# Patient Record
Sex: FEMALE | Race: WHITE | NOT HISPANIC OR LATINO
[De-identification: names, ages, dates, MRNs, and addresses within clinical notes are randomized per-mention and may not be internally consistent; named-entity substitution may affect disease eponyms.]

---

## 2023-02-03 PROBLEM — Z00.00 ENCOUNTER FOR PREVENTIVE HEALTH EXAMINATION: Status: ACTIVE | Noted: 2023-02-03

## 2023-02-06 ENCOUNTER — APPOINTMENT (OUTPATIENT)
Dept: OTOLARYNGOLOGY | Facility: CLINIC | Age: 88
End: 2023-02-06
Payer: MEDICARE

## 2023-02-06 ENCOUNTER — NON-APPOINTMENT (OUTPATIENT)
Age: 88
End: 2023-02-06

## 2023-02-06 VITALS
SYSTOLIC BLOOD PRESSURE: 120 MMHG | DIASTOLIC BLOOD PRESSURE: 67 MMHG | WEIGHT: 138 LBS | HEIGHT: 63 IN | HEART RATE: 84 BPM | BODY MASS INDEX: 24.45 KG/M2

## 2023-02-06 DIAGNOSIS — Z86.39 PERSONAL HISTORY OF OTHER ENDOCRINE, NUTRITIONAL AND METABOLIC DISEASE: ICD-10-CM

## 2023-02-06 DIAGNOSIS — Z83.3 FAMILY HISTORY OF DIABETES MELLITUS: ICD-10-CM

## 2023-02-06 DIAGNOSIS — Z86.79 PERSONAL HISTORY OF OTHER DISEASES OF THE CIRCULATORY SYSTEM: ICD-10-CM

## 2023-02-06 DIAGNOSIS — Z82.49 FAMILY HISTORY OF ISCHEMIC HEART DISEASE AND OTHER DISEASES OF THE CIRCULATORY SYSTEM: ICD-10-CM

## 2023-02-06 DIAGNOSIS — Z87.891 PERSONAL HISTORY OF NICOTINE DEPENDENCE: ICD-10-CM

## 2023-02-06 PROCEDURE — 99203 OFFICE O/P NEW LOW 30 MIN: CPT

## 2023-02-06 PROCEDURE — 92567 TYMPANOMETRY: CPT

## 2023-02-06 PROCEDURE — 92557 COMPREHENSIVE HEARING TEST: CPT

## 2023-02-06 RX ORDER — DIFLUPREDNATE 0.5 MG/ML
0.05 EMULSION OPHTHALMIC
Qty: 5 | Refills: 0 | Status: ACTIVE | COMMUNITY
Start: 2023-01-19

## 2023-02-06 RX ORDER — METOPROLOL TARTRATE 25 MG/1
25 TABLET, FILM COATED ORAL
Qty: 270 | Refills: 0 | Status: ACTIVE | COMMUNITY
Start: 2023-01-10

## 2023-02-06 RX ORDER — LOSARTAN POTASSIUM 25 MG/1
25 TABLET, FILM COATED ORAL
Qty: 90 | Refills: 0 | Status: ACTIVE | COMMUNITY
Start: 2023-01-10

## 2023-02-06 RX ORDER — APIXABAN 5 MG/1
5 TABLET, FILM COATED ORAL
Qty: 180 | Refills: 0 | Status: ACTIVE | COMMUNITY
Start: 2022-08-31

## 2023-02-06 RX ORDER — LEVOTHYROXINE SODIUM 0.07 MG/1
75 TABLET ORAL
Qty: 90 | Refills: 0 | Status: ACTIVE | COMMUNITY
Start: 2023-01-10

## 2023-02-06 RX ORDER — FLUOCINOLONE ACETONIDE 0.11 MG/ML
0.01 OIL TOPICAL
Qty: 118 | Refills: 0 | Status: ACTIVE | COMMUNITY
Start: 2022-08-25

## 2023-02-06 RX ORDER — ESCITALOPRAM OXALATE 10 MG/1
10 TABLET ORAL
Qty: 90 | Refills: 0 | Status: DISCONTINUED | COMMUNITY
Start: 2023-01-10

## 2023-02-06 RX ORDER — ROSUVASTATIN CALCIUM 20 MG/1
20 TABLET, FILM COATED ORAL
Qty: 90 | Refills: 0 | Status: ACTIVE | COMMUNITY
Start: 2023-01-10

## 2023-02-06 NOTE — PHYSICAL EXAM
[Hearing Posada Test (Tuning Fork On Forehead)] : no lateralization of tone [] : septum deviated to the left [Midline] : trachea located in midline position [Normal] : orientation to person, place, and time: normal [de-identified] : enlarged submandibular region, right lymph node [FreeTextEntry8] : cerumen removed via curettage [FreeTextEntry9] : cerumen removed via curettage [de-identified] : class 1 [FreeTextEntry2] : sinuses nontender to percussion.

## 2023-02-06 NOTE — HISTORY OF PRESENT ILLNESS
[de-identified] : Patient presents to office with hearing loss and loud sound in both her ears that comes and goes particularly in a room with no background noise. Patient states she always ask individuals to repeat themselves. She states she has a hard time recognizing words. Pt notes she has noticed the decrease in hearing for the past couple years. Pt notes she had hearing aids for 5 years which she purchased online, but she hasn't been using them. Pt notes she fell on her left side last year.

## 2023-02-06 NOTE — ASSESSMENT
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 asymmetric SRS noted\par right: 92% discrim at 75db\par left: 76% discrim 70db\par \par Plan:\par Cerumen removed b/l. Audio - results interpreted by Dr. Sol and reviewed with the patient.  US neck ordered. CT head ordered.

## 2023-02-06 NOTE — DATA REVIEWED
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 asymmetric SRS noted\par REC: ENT f/u, re-eval per MD, consider new HAs, further testing per MD\par

## 2023-02-06 NOTE — ADDENDUM
[FreeTextEntry1] : Documented by Chantelle Hines acting as scribe for Dr. Sol on 02/06/2023.\par \par All Medical record entries made by the scribe were at my, Dr. Sol,direction and personally dictated by me on 02/06/2023. I have reviewed the chart and agree that the record accurately reflects my personal performance of the history, physical exam, assessment and plan. I have also personally directed, reviewed, and agreed with the discharge instructions.

## 2023-03-13 ENCOUNTER — APPOINTMENT (OUTPATIENT)
Dept: OTOLARYNGOLOGY | Facility: CLINIC | Age: 88
End: 2023-03-13
Payer: MEDICARE

## 2023-03-13 VITALS
SYSTOLIC BLOOD PRESSURE: 139 MMHG | WEIGHT: 140 LBS | HEIGHT: 63 IN | BODY MASS INDEX: 24.8 KG/M2 | DIASTOLIC BLOOD PRESSURE: 73 MMHG | HEART RATE: 69 BPM

## 2023-03-13 DIAGNOSIS — R22.0 LOCALIZED SWELLING, MASS AND LUMP, HEAD: ICD-10-CM

## 2023-03-13 DIAGNOSIS — H93.13 TINNITUS, BILATERAL: ICD-10-CM

## 2023-03-13 DIAGNOSIS — R22.1 LOCALIZED SWELLING, MASS AND LUMP, HEAD: ICD-10-CM

## 2023-03-13 PROCEDURE — 99214 OFFICE O/P EST MOD 30 MIN: CPT

## 2023-03-13 NOTE — HISTORY OF PRESENT ILLNESS
[de-identified] : Pt presents with h/o asymmetric hearing loss, b/l tinnitus, enlarged submandibular region, right lymph node presents today to discuss CT head and IACs and US neck results. Denies hearing herself blink. Pt notes she gets PND in the morning. Pt notes she tried Flonase, but it didn't help.

## 2023-03-13 NOTE — ASSESSMENT
[FreeTextEntry1] : Reviewed and reconciled medications, allergies, PMHx, PSHx, SocHx, FMHx.\par \par Pt presents with h/o asymmetric hearing loss, b/l tinnitus, enlarged submandibular region, right lymph node presents today to discuss CT head and IACs and US neck results. 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 Discussed previous audio. Consider new hearing aids. More the 50% of time was spent counseling the patient. Add citrus or sour to water. Atrovent - 1 or 2 sprays bilaterally up to QID, spray laterally. FU 1 year.

## 2023-03-13 NOTE — ADDENDUM
[FreeTextEntry1] : Documented by Chantelle Hines acting as scribe for Dr. Sol on 03/13/2023.\par \par All Medical record entries made by the scribe were at my, Dr. Sol,direction and personally dictated by me on 03/13/2023. I have reviewed the chart and agree that the record accurately reflects my personal performance of the history, physical exam, assessment and plan. I have also personally directed, reviewed, and agreed with the discharge instructions.

## 2023-03-16 ENCOUNTER — EMERGENCY (EMERGENCY)
Facility: HOSPITAL | Age: 88
LOS: 1 days | Discharge: HOME | End: 2023-03-16
Attending: EMERGENCY MEDICINE | Admitting: EMERGENCY MEDICINE
Payer: MEDICARE

## 2023-03-16 VITALS
WEIGHT: 138.01 LBS | DIASTOLIC BLOOD PRESSURE: 66 MMHG | OXYGEN SATURATION: 97 % | TEMPERATURE: 98 F | HEIGHT: 64 IN | HEART RATE: 67 BPM | SYSTOLIC BLOOD PRESSURE: 123 MMHG | RESPIRATION RATE: 18 BRPM

## 2023-03-16 VITALS
RESPIRATION RATE: 18 BRPM | SYSTOLIC BLOOD PRESSURE: 185 MMHG | HEART RATE: 71 BPM | DIASTOLIC BLOOD PRESSURE: 84 MMHG | TEMPERATURE: 97 F | OXYGEN SATURATION: 95 %

## 2023-03-16 PROCEDURE — 73060 X-RAY EXAM OF HUMERUS: CPT

## 2023-03-16 PROCEDURE — 73030 X-RAY EXAM OF SHOULDER: CPT

## 2023-03-16 PROCEDURE — 71045 X-RAY EXAM CHEST 1 VIEW: CPT | Mod: 26

## 2023-03-16 PROCEDURE — 99284 EMERGENCY DEPT VISIT MOD MDM: CPT | Mod: 25

## 2023-03-16 PROCEDURE — 73020 X-RAY EXAM OF SHOULDER: CPT

## 2023-03-16 PROCEDURE — 73030 X-RAY EXAM OF SHOULDER: CPT | Mod: 26,LT

## 2023-03-16 PROCEDURE — 71045 X-RAY EXAM CHEST 1 VIEW: CPT

## 2023-03-16 PROCEDURE — 99285 EMERGENCY DEPT VISIT HI MDM: CPT

## 2023-03-16 PROCEDURE — 73060 X-RAY EXAM OF HUMERUS: CPT | Mod: 26,LT

## 2023-03-16 RX ORDER — ACETAMINOPHEN 500 MG
650 TABLET ORAL ONCE
Refills: 0 | Status: COMPLETED | OUTPATIENT
Start: 2023-03-16 | End: 2023-03-16

## 2023-03-16 RX ADMIN — Medication 650 MILLIGRAM(S): at 12:19

## 2023-03-16 NOTE — ED PROVIDER NOTE - CARE PROVIDER_API CALL
Neri Eldridge  ORTHOPAEDIC SURGERY  [address] [place] [address]  Phone: [phone]  Fax: [phone]  Follow Up Time: 4-6 Days

## 2023-03-16 NOTE — CONSULT NOTE ADULT - SUBJECTIVE AND OBJECTIVE BOX
89y Female presents with L Shoulder pain s/p MF this morning. RHD. Community ambulator w/o assistance at baseline. Pt is visiting from [place]. Pt states she was walking in her house, tripped and fell onto L shoulder. No HS/LOC. Pt noticed immediate pain and inability to range shoulder s/p MF. No hx of previous injury to shoulder. No other injuries at this time. Denies Numbness/tingling, weakness, CP, SOB, fever, chills or any other complaints.     PAST MEDICAL & SURGICAL HISTORY:    Home Medications:    Allergies    No Known Allergies    Intolerances    Vital Signs Last 24 Hrs  T(C): 36.5 (16 Mar 2023 10:29), Max: 36.5 (16 Mar 2023 10:29)  T(F): 97.7 (16 Mar 2023 10:29), Max: 97.7 (16 Mar 2023 10:29)  HR: 67 (16 Mar 2023 10:29) (67 - 67)  BP: 123/66 (16 Mar 2023 10:29) (123/66 - 123/66)  BP(mean): --  RR: 18 (16 Mar 2023 10:29) (18 - 18)  SpO2: 97% (16 Mar 2023 10:29) (97% - 97%)    Parameters below as of 16 Mar 2023 10:29  Patient On (Oxygen Delivery Method): room air    PHYSICAL EXAM  General: NAD, Awake and Alert  LUE:   Skin intact, mild swelling noted at left shoulder   ROM of shoulder limited secondary to pain  TTP at shoulder diffusely  No TTP elsewhere along extremity  + AIN/PIN/U/M/Musc/Ax  SILT C5-T1  2+ Radial  Compartments soft and compressible     SECONDARY EXAM: Benign, Skin intact, SILT throughout, motor grossly intact throughout, no other orthopedic injuries at this time, compartments soft and compressible    SPINE: Skin intact, no bony tenderness or step-offs appreciated throughout cervical/thoracic/lumbar/sacral spine    RUE: Skin intact, no erythema, ecchymosis, edema, gross deformity, NTTP over the bony prominences of the shoulder/elbow/wrist/hand, painless passive/active ROM of the shoulder/elbow/wrist/hand, C5-T1 SILT, motor grossly intact throughout axillary/musculocutaneous/radial/median/ulnar nerves, + radial pulse    BLLE: Skin intact, no erythema, ecchymosis, edema, gross deformity, NTTP over the bony prominences of the hip/knee/ankle/foot, painless passive/active ROM of the hip/knee/ankle/foot, L2-S1 SILT, motor grossly intact throughout Hip Flexors/Quadriceps/Hamstrings/TA/EHL/FHL/GSC, + DP/PT pulses, no pain with log roll, no pain on axial loading, compartments soft and compressible, calf nontender       IMAGING:  XR : L Proximal humerus fracture, no dislocation    Assessment/Plan:  89y Female with L Proximal humerus fracture s/p MF.     - Plan for non-operative management at this time. NWB LUE in sling.   -Pain control as needed  -DVT ppx per primary  -Ortho stable for dc  - No acute orthopedic surgical intervention at this time.   - Patient would like to follow up w/ orthopedic surgeon in [place] after she returns in 1 week. Dr. Eldridge aware. If any issues prior to returning to [place], pt may follow up w/ Dr. Eldridge in office.   -Discussed plan w/ Dr. Eldridge who agrees.

## 2023-03-16 NOTE — ED PROVIDER NOTE - PATIENT PORTAL LINK FT
You can access the FollowMyHealth Patient Portal offered by [facility] by registering at the following website: [email or web address]. By joining [facility]’s FollowMyHealth portal, you will also be able to view your health information using other applications (apps) compatible with our system.

## 2023-03-16 NOTE — ED ADULT NURSE NOTE - NSIMPLEMENTINTERV_GEN_ALL_ED
Implemented All Fall Risk Interventions:  [place] to call system. Call bell, personal items and telephone within reach. Instruct patient to call for assistance. Room bathroom lighting operational. Non-slip footwear when patient is off stretcher. Physically safe environment: no spills, clutter or unnecessary equipment. Stretcher in lowest position, wheels locked, appropriate side rails in place. Provide visual cue, wrist band, yellow gown, etc. Monitor gait and stability. Monitor for mental status changes and reorient to person, place, and time. Review medications for side effects contributing to fall risk. Reinforce activity limits and safety measures with patient and family.

## 2023-03-16 NOTE — ED PROVIDER NOTE - CLINICAL SUMMARY MEDICAL DECISION MAKING FREE TEXT BOX
89yo F with DM2, HTN, afib on eliquis, c/o left shoulder pain s/p trip and fall. sling, xrays, tylenol, shoulder dislocation, humerus fracture.

## 2023-03-16 NOTE — ED ADULT TRIAGE NOTE - CHIEF COMPLAINT QUOTE
Pt arrived to triage c/o shoulder pain s/p mechanical trip and fall 30 minutes ago.   Pt denies head injury.  L hand warm with +radial pulses and good  strength.  Pt unable to lift L arm, L elbow mobile with assistance.   Pt denies additional injury with fall, denies pelvic/abd/neck pain.  Pt takes eliquis.  BN

## 2023-03-16 NOTE — ED PROVIDER NOTE - OBJECTIVE STATEMENT
89yo F with DM2, HTN, afib on eliquis, c/o left shoulder pain s/p trip and fall x 1 hour ago, pt st turned and lost footing and fell onto left shoulder. pt denies dizziness, lightheadedness, head trauma, headache, neck pain, LOC, numbness, weakness, back pain, chest pain, sob, palpitations, abd pain.    right hand dominant  pcp= dr gonzalez in [place]  covid vaccine x 3  pt offered painmedication but st will only take tylenol

## 2023-03-16 NOTE — ED ADULT NURSE NOTE - OBJECTIVE STATEMENT
patient comes in after a fall at home. patient is A&Ox4. no LOC. patient did not hit head. patient turned around and lost her balance. patient fell on L shoulder. patient has pain to L shoulder. sling in place from Triage.

## 2023-03-16 NOTE — ED PROVIDER NOTE - PHYSICAL EXAMINATION
Gen: Awake, Alert, WD, WN, NAD  Head:  NC/AT  Eyes:  PERRL, EOMI, Conjunctiva pink, lids normal, no scleral icterus  ENT: OP clear, no exudates, no erythema, uvula midline, moist mucus membranes  Neck: supple, nontender, trachea midline  Cardiac/CV:  S1 S2, RRR, no M/G/R  Chest: nontender, no crepitus, no clavicle tenderness  Respiratory/Pulm:  CTAB, good air movement, normal resp effort, no wheezes/stridor/retractions/rales/rhonchi  Gastrointestinal/Abdomen:  Soft, nontender, nondistended, +BS, no rebound/guarding  Pelvis: stable, nontender, Hips: FROM, nontender  Back:  no CVAT, no MLT  Ext:  warm, well perfused, no cyanosis, no erythema, no edema, 2+ radial pulses, +swelling and ttp left GH joint area with dec rom sec to pain, sling applied  Skin: intact, no rash, no vesicles, no petechiae, no ecchymosis  Neuro:  AAOx3, sensation intact, motor 5/5 x 4 extremities, normal gait, speech clear

## 2023-03-16 NOTE — ED PROVIDER NOTE - NSFOLLOWUPINSTRUCTIONS_ED_ALL_ED_FT
[employer]® CareNotes®     :  [facility]  	      PROXIMAL HUMERUS FRACTURE - AfterCare(R) Instructions(ER/ED)     Proximal Humerus Fracture    WHAT YOU NEED TO KNOW:    A proximal humerus fracture is a crack or break in the top of your upper arm bone. The proximal humerus is one of the bones in your shoulder joint.  Shoulder Anatomy     DISCHARGE INSTRUCTIONS:    Return to the emergency department if:   •Your pain does not get better or gets worse, even after you rest and take medicine.    •Your arm, hand, or fingers feel numb.    •The skin over your fracture is swollen, cold, or pale.    •You cannot move your arm, hand, or fingers.     Call your doctor or orthopedist if:   •You have a fever.    •Your sling gets wet, damaged, or falls off.    •You have questions or concerns about your condition or care.    Medicines: You may need any of the following:   •Prescription pain medicine may be given. Ask your healthcare provider how to take this medicine safely. Some prescription pain medicines contain acetaminophen. Do not take other medicines that contain acetaminophen without talking to your healthcare provider. Too much acetaminophen may cause liver damage. Prescription pain medicine may cause constipation. Ask your healthcare provider how to prevent or treat constipation.       •NSAIDs, such as ibuprofen, help decrease swelling, pain, and fever. This medicine is available with or without a doctor's order. NSAIDs can cause stomach bleeding or kidney problems in certain people. If you take blood thinner medicine, always ask your healthcare provider if NSAIDs are safe for you. Always read the medicine label and follow directions.      •Acetaminophen decreases pain and fever. It is available without a doctor's order. Ask how much to take and how often to take it. Follow directions. Read the labels of all other medicines you are using to see if they also contain acetaminophen, or ask your doctor or pharmacist. Acetaminophen can cause liver damage if not taken correctly.      •Take your medicine as directed. Contact your healthcare provider if you think your medicine is not helping or if you have side effects. Tell your provider if you are allergic to any medicine. Keep a list of the medicines, vitamins, and herbs you take. Include the amounts, and when and why you take them. Bring the list or the pill bottles to follow-up visits. Carry your medicine list with you in case of an emergency.      A sling may be needed to hold your broken bones in place. It will decrease your arm movement and allow the bones to heal.  Shoulder Sling     Manage your symptoms:   •Rest your arm as much as possible. Ask your healthcare provider when you can move your arm. Also ask when you can return to sports or vigorous exercises.    •Apply ice on your arm for 15 to 20 minutes every hour or as directed. Use an ice pack, or put crushed ice in a plastic bag. Cover it with a towel before you put it on your arm. Ice helps prevent tissue damage and decreases swelling and pain.    •Go to physical therapy as directed. A physical therapist teaches you exercises to help improve movement and strength, and to decrease pain.    Follow up with your doctor or orthopedist as directed: Write down your questions so you remember to ask them during your visits.     © Copyright [employer] 2023       Keep sling on.  Cold packs 10 minutes on/10 minutes off  Sleep in reclined position.  Follow up with orthopedist this week. Dr. Venegas  Tylenol 1-2 tablets every 6 hours as needed.  Please return to the Emergency Department immediately for any problems or concerns. [employer]® CareNotes®     :  [facility]  	      PROXIMAL HUMERUS FRACTURE - AfterCare(R) Instructions(ER/ED)     Proximal Humerus Fracture    WHAT YOU NEED TO KNOW:    A proximal humerus fracture is a crack or break in the top of your upper arm bone. The proximal humerus is one of the bones in your shoulder joint.  Shoulder Anatomy     DISCHARGE INSTRUCTIONS:    Return to the emergency department if:   •Your pain does not get better or gets worse, even after you rest and take medicine.    •Your arm, hand, or fingers feel numb.    •The skin over your fracture is swollen, cold, or pale.    •You cannot move your arm, hand, or fingers.     Call your doctor or orthopedist if:   •You have a fever.    •Your sling gets wet, damaged, or falls off.    •You have questions or concerns about your condition or care.    Medicines: You may need any of the following:   •Prescription pain medicine may be given. Ask your healthcare provider how to take this medicine safely. Some prescription pain medicines contain acetaminophen. Do not take other medicines that contain acetaminophen without talking to your healthcare provider. Too much acetaminophen may cause liver damage. Prescription pain medicine may cause constipation. Ask your healthcare provider how to prevent or treat constipation.       •NSAIDs, such as ibuprofen, help decrease swelling, pain, and fever. This medicine is available with or without a doctor's order. NSAIDs can cause stomach bleeding or kidney problems in certain people. If you take blood thinner medicine, always ask your healthcare provider if NSAIDs are safe for you. Always read the medicine label and follow directions.      •Acetaminophen decreases pain and fever. It is available without a doctor's order. Ask how much to take and how often to take it. Follow directions. Read the labels of all other medicines you are using to see if they also contain acetaminophen, or ask your doctor or pharmacist. Acetaminophen can cause liver damage if not taken correctly.      •Take your medicine as directed. Contact your healthcare provider if you think your medicine is not helping or if you have side effects. Tell your provider if you are allergic to any medicine. Keep a list of the medicines, vitamins, and herbs you take. Include the amounts, and when and why you take them. Bring the list or the pill bottles to follow-up visits. Carry your medicine list with you in case of an emergency.      A sling may be needed to hold your broken bones in place. It will decrease your arm movement and allow the bones to heal.  Shoulder Sling     Manage your symptoms:   •Rest your arm as much as possible. Ask your healthcare provider when you can move your arm. Also ask when you can return to sports or vigorous exercises.    •Apply ice on your arm for 15 to 20 minutes every hour or as directed. Use an ice pack, or put crushed ice in a plastic bag. Cover it with a towel before you put it on your arm. Ice helps prevent tissue damage and decreases swelling and pain.    •Go to physical therapy as directed. A physical therapist teaches you exercises to help improve movement and strength, and to decrease pain.    Follow up with your doctor or orthopedist as directed: Write down your questions so you remember to ask them during your visits.     © Copyright [employer] 2023       Keep sling on.  Cold packs 10 minutes on/10 minutes off  Sleep in reclined position.  Follow up with orthopedist this week. Dr. Venegas or with your orthopedist in [place]  Tylenol 1-2 tablets every 6 hours as needed.  Please return to the Emergency Department immediately for any problems or concerns.

## 2023-03-16 NOTE — ED PROVIDER NOTE - PROGRESS NOTE DETAILS
orthopedic service evaluated patient, st clear for discharge, sling, cold packs, tylenol and follow up with orthopedist in [place]. pt returning to [place] next wednesday. pt provided these instructions and demonstrated understanding.

## 2023-03-17 ENCOUNTER — APPOINTMENT (OUTPATIENT)
Dept: ORTHOPEDIC SURGERY | Facility: CLINIC | Age: 88
End: 2023-03-17
Payer: MEDICARE

## 2023-03-17 VITALS — SYSTOLIC BLOOD PRESSURE: 109 MMHG | HEART RATE: 75 BPM | DIASTOLIC BLOOD PRESSURE: 70 MMHG

## 2023-03-17 DIAGNOSIS — S42.202A UNSPECIFIED FRACTURE OF UPPER END OF LEFT HUMERUS, INITIAL ENCOUNTER FOR CLOSED FRACTURE: ICD-10-CM

## 2023-03-17 PROCEDURE — 23600 CLTX PROX HUMRL FX W/O MNPJ: CPT | Mod: LT

## 2023-03-17 PROCEDURE — 99202 OFFICE O/P NEW SF 15 MIN: CPT | Mod: 57

## 2023-03-17 NOTE — DISCUSSION/SUMMARY
[de-identified] : Left shoulder proximal humerus fracture.  Discussed with her that based on the x-rays, I believe that we can treat this fracture nonoperatively.  This would include a period of immobilization in the sling for 6 weeks.  She may come out of the sling for hygiene purposes and a few times a day to do elbow range of motion.  She should also be doing finger and wrist range of motion.  She may use Tylenol for pain control and should ice the shoulder as well.  I will see her back in 6 weeks for repeat evaluation and repeat x-rays.  At that time as long as the x-rays appear similar, I will start her on physical therapy to regain her shoulder range of motion.  The patient expressed understanding of her diagnosis and treatment plan and all questions were answered.\par \par This note was generated using dragon medical dictation software.  A reasonable effort has been made for proofreading its contents, but typos may still remain.  If there are any questions or points of clarification needed please notify my office.

## 2023-03-17 NOTE — HISTORY OF PRESENT ILLNESS
[de-identified] : CHRISTINA is a 89 year old right-hand-dominant female who presents today for an initial visit after a left shoulder injury.  She says that yesterday she had a mechanical fall and landed onto her left arm.  She went to the ER where x-rays demonstrated a proximal humerus fracture.  She was placed into a sling and told to follow-up.  She reports that she has some pain with movement, but has been keeping it in the sling and so generally the pain has not been too bad.  She does report some swelling in her fingers.  She has been icing it and using Tylenol for pain control.

## 2023-03-17 NOTE — REASON FOR VISIT
[Initial Visit] : an initial visit for [Shoulder Pain] : shoulder pain [Shoulder Injury] : shoulder injury

## 2023-03-17 NOTE — PHYSICAL EXAM
[de-identified] : General: No apparent distress\par Cardiovascular: extremities warm and well-perfused, no cyanosis\par Pulmonary: non labored respirations\par \par Left shoulder:\par Tenderness to palpation about shoulder\par Fires EPL/FPL/dorsal interossei/wrist extensors\par Sensation intact light touch in median/ulnar/radial/axillary distributions\par Radial pulse 2+\par Moderate swelling about fingers [de-identified] : 2 views of the left shoulder obtained in the ER demonstrate proximal humerus fracture which is comminuted and impacted

## 2023-04-21 ENCOUNTER — APPOINTMENT (OUTPATIENT)
Dept: ORTHOPEDIC SURGERY | Facility: CLINIC | Age: 88
End: 2023-04-21
Payer: MEDICARE

## 2023-04-21 VITALS — SYSTOLIC BLOOD PRESSURE: 160 MMHG | HEART RATE: 85 BPM | DIASTOLIC BLOOD PRESSURE: 79 MMHG

## 2023-04-21 PROCEDURE — 99024 POSTOP FOLLOW-UP VISIT: CPT

## 2023-04-21 PROCEDURE — 73030 X-RAY EXAM OF SHOULDER: CPT | Mod: LT

## 2023-04-21 NOTE — DISCUSSION/SUMMARY
[de-identified] : Left proximal humerus fracture treated nonoperatively, healing.  Discussed with her that the x-rays demonstrate that her fracture is healing and I recommend continuing nonoperative treatment.  At this point she may discontinue wearing the sling.  In addition I will write her prescription for physical therapy.  She is do PT 2-3 times a week for the next 6 weeks to improve her shoulder range of motion.  We discussed that fracture healing can take at least 3 months, so while her fracture is healing it is not fully healed yet.  She should come back and see me in 6 weeks for repeat evaluation and repeat x-rays.  The patient expressed understanding of her diagnosis and treatment plan and all questions were answered.\par \par This note was generated using dragon medical dictation software.  A reasonable effort has been made for proofreading its contents, but typos may still remain.  If there are any questions or points of clarification needed please notify my office.

## 2023-04-21 NOTE — PHYSICAL EXAM
[de-identified] : General: No apparent distress\par Cardiovascular: extremities warm and well-perfused, no cyanosis\par Pulmonary: non labored respirations\par \par Left shoulder:\par No Tenderness to palpation about shoulder\par Fires EPL/FPL/dorsal interossei/wrist extensors\par Sensation intact light touch in median/ulnar/radial/axillary distributions\par Radial pulse 2+ [de-identified] : 3 views of the left shoulder obtained today and interpreted by me demonstrate proximal humerus fracture in maintained alignment to previous x-rays with interval mild callus formation

## 2023-04-21 NOTE — HISTORY OF PRESENT ILLNESS
[de-identified] : CHRISTINA is a 89 year old right-hand-dominant female who presents today for a follow-up visit for left shoulder injury about 5 weeks ago.  She reports that her pain is much better and shoulder.  She occasionally gets some pain in the posterior aspect of her humerus, but says that overall her shoulder pain is much improved.  She reports that she has been doing elbow, wrist, hand range of motion and has been wearing the sling as prescribed.

## 2023-06-16 ENCOUNTER — APPOINTMENT (OUTPATIENT)
Dept: ORTHOPEDIC SURGERY | Facility: CLINIC | Age: 88
End: 2023-06-16
Payer: MEDICARE

## 2023-06-16 VITALS — DIASTOLIC BLOOD PRESSURE: 69 MMHG | SYSTOLIC BLOOD PRESSURE: 130 MMHG

## 2023-06-16 PROCEDURE — 99212 OFFICE O/P EST SF 10 MIN: CPT

## 2023-06-16 PROCEDURE — 73030 X-RAY EXAM OF SHOULDER: CPT | Mod: LT

## 2023-06-16 NOTE — HISTORY OF PRESENT ILLNESS
[de-identified] : CHRISTINA is a 89 year old right-hand-dominant female who presents today for a follow-up visit for left shoulder injury.  Overall she is doing very well and has no pain in the arm.  She has been moving it at home and has regained most of her range of motion.  She has a little bit of restriction raising the arm above the head, but otherwise uses the shoulder as normal.

## 2023-06-16 NOTE — DISCUSSION/SUMMARY
[de-identified] : Left proximal humerus fracture treated nonoperatively, healed.  Discussed with her that her fracture appears to be healed.  She may return to full activity as tolerated.  She will continue to do home exercises to continue improving her range of motion.  She may follow-up with me on an as-needed basis.  The patient expressed understanding of her diagnosis and treatment plan and all questions were answered.\par \par This note was generated using dragon medical dictation software.  A reasonable effort has been made for proofreading its contents, but typos may still remain.  If there are any questions or points of clarification needed please notify my office.

## 2023-06-28 ENCOUNTER — APPOINTMENT (OUTPATIENT)
Dept: OTOLARYNGOLOGY | Facility: CLINIC | Age: 88
End: 2023-06-28
Payer: MEDICARE

## 2023-06-28 VITALS
HEART RATE: 78 BPM | DIASTOLIC BLOOD PRESSURE: 59 MMHG | HEIGHT: 62 IN | WEIGHT: 138 LBS | SYSTOLIC BLOOD PRESSURE: 98 MMHG | BODY MASS INDEX: 25.4 KG/M2

## 2023-06-28 DIAGNOSIS — H91.93 UNSPECIFIED HEARING LOSS, BILATERAL: ICD-10-CM

## 2023-06-28 PROCEDURE — 99213 OFFICE O/P EST LOW 20 MIN: CPT

## 2023-06-28 NOTE — HISTORY OF PRESENT ILLNESS
[de-identified] : Pt. with h/o asymmetric hearing loss(wears bilateral hearing aids), b/l tinnitus, PND, enlarged submandibular region, right lymph node presents today stating that the Ipratropium Bromide nasal spray has been extremely helpful, she states she uses the spray 2 times a day. Patient adds that her ears are itchy.

## 2023-06-28 NOTE — ASSESSMENT
[FreeTextEntry1] : Reviewed and reconciled medications, allergies, PMHx, PSHx, SocHx, FMHx \par \par Pt. with h/o asymmetric hearing loss(wears bilateral hearing aids), b/l tinnitus, PND, enlarged submandibular region, right lymph node presents today stating that the Ipratropium Bromide nasal spray has been extremely helpful, she states she uses the spray 2 times a day. Patient adds that her ears are itchy. \par \par Physical Exam:\par -Right ear: cerumen removed via curettage. A little dry and flaky\par -Left ear: cerumen removed via curettage. A  little dry and flaky \par -no response to tuning fork\par -nasal valve collapse\par -inflamed turbinated\par -submandibular gland are slightly enlarged bilaterally but soft and symmetric\par -type 2 oral cavity\par -tonsils class 1\par \par Plan: cerumen removed bilaterally. Continue nasal spray. Use the Fluocinolone oil for itchy ears. FU 6 months

## 2023-06-28 NOTE — PHYSICAL EXAM
[Midline] : trachea located in midline position [Normal] : no rashes [de-identified] : submandibular gland are slightly enlarged bilaterally but soft and symmetric  [Hearing Loss Right Only] : normal [Hearing Loss Left Only] : normal [FreeTextEntry8] : cerumen removed via curettage. A  little dry and flaky  [FreeTextEntry9] : cerumen removed via curettage. A  little dry and flaky  [FreeTextEntry5] : -no response to tuning fork [de-identified] : nasal valve collapse [de-identified] : inflamed turbinates [de-identified] : class 1 [de-identified] : type 2 oral cavity

## 2023-09-20 ENCOUNTER — APPOINTMENT (RX ONLY)
Dept: URBAN - METROPOLITAN AREA CLINIC 100 | Facility: CLINIC | Age: 88
Setting detail: DERMATOLOGY
End: 2023-09-20

## 2023-09-20 DIAGNOSIS — Z71.89 OTHER SPECIFIED COUNSELING: ICD-10-CM

## 2023-09-20 DIAGNOSIS — L72.0 EPIDERMAL CYST: ICD-10-CM

## 2023-09-20 DIAGNOSIS — L81.4 OTHER MELANIN HYPERPIGMENTATION: ICD-10-CM

## 2023-09-20 DIAGNOSIS — T07XXXA INSECT BITE, NONVENOMOUS, OF OTHER, MULTIPLE, AND UNSPECIFIED SITES, WITHOUT MENTION OF INFECTION: ICD-10-CM

## 2023-09-20 DIAGNOSIS — L82.1 OTHER SEBORRHEIC KERATOSIS: ICD-10-CM

## 2023-09-20 DIAGNOSIS — L57.8 OTHER SKIN CHANGES DUE TO CHRONIC EXPOSURE TO NONIONIZING RADIATION: ICD-10-CM

## 2023-09-20 DIAGNOSIS — D18.0 HEMANGIOMA: ICD-10-CM

## 2023-09-20 DIAGNOSIS — D22 MELANOCYTIC NEVI: ICD-10-CM

## 2023-09-20 DIAGNOSIS — Z85.828 PERSONAL HISTORY OF OTHER MALIGNANT NEOPLASM OF SKIN: ICD-10-CM

## 2023-09-20 PROBLEM — D18.01 HEMANGIOMA OF SKIN AND SUBCUTANEOUS TISSUE: Status: ACTIVE | Noted: 2023-09-20

## 2023-09-20 PROBLEM — S30.860A INSECT BITE (NONVENOMOUS) OF LOWER BACK AND PELVIS, INITIAL ENCOUNTER: Status: ACTIVE | Noted: 2023-09-20

## 2023-09-20 PROBLEM — D22.9 MELANOCYTIC NEVI, UNSPECIFIED: Status: ACTIVE | Noted: 2023-09-20

## 2023-09-20 PROCEDURE — ? PRESCRIPTION

## 2023-09-20 PROCEDURE — 99213 OFFICE O/P EST LOW 20 MIN: CPT

## 2023-09-20 PROCEDURE — ? COUNSELING

## 2023-09-20 PROCEDURE — ? SUNSCREEN RECOMMENDATIONS

## 2023-09-20 PROCEDURE — ? DIAGNOSIS COMMENT

## 2023-09-20 RX ORDER — TRIAMCINOLONE ACETONIDE 1 MG/G
CREAM TOPICAL BID
Qty: 80 | Refills: 2 | Status: ERX | COMMUNITY
Start: 2023-09-20

## 2023-09-20 RX ADMIN — TRIAMCINOLONE ACETONIDE: 1 CREAM TOPICAL at 00:00

## 2023-09-20 ASSESSMENT — LOCATION DETAILED DESCRIPTION DERM
LOCATION DETAILED: RIGHT ANTERIOR DISTAL THIGH
LOCATION DETAILED: INFERIOR THORACIC SPINE
LOCATION DETAILED: RIGHT MEDIAL BREAST 2-3:00 REGION
LOCATION DETAILED: RIGHT ANTERIOR DISTAL UPPER ARM
LOCATION DETAILED: RIGHT UPPER CUTANEOUS LIP
LOCATION DETAILED: LEFT ANTERIOR PROXIMAL UPPER ARM
LOCATION DETAILED: RIGHT SUPERIOR FOREHEAD
LOCATION DETAILED: LEFT BUTTOCK
LOCATION DETAILED: SUPERIOR THORACIC SPINE
LOCATION DETAILED: LEFT MEDIAL UPPER BACK
LOCATION DETAILED: RIGHT SUPERIOR NASAL CHEEK
LOCATION DETAILED: LEFT ANTERIOR DISTAL THIGH

## 2023-09-20 ASSESSMENT — LOCATION ZONE DERM
LOCATION ZONE: FACE
LOCATION ZONE: LEG
LOCATION ZONE: TRUNK
LOCATION ZONE: LIP
LOCATION ZONE: ARM

## 2023-09-20 ASSESSMENT — LOCATION SIMPLE DESCRIPTION DERM
LOCATION SIMPLE: RIGHT BREAST
LOCATION SIMPLE: RIGHT CHEEK
LOCATION SIMPLE: RIGHT FOREHEAD
LOCATION SIMPLE: RIGHT LIP
LOCATION SIMPLE: RIGHT THIGH
LOCATION SIMPLE: RIGHT UPPER ARM
LOCATION SIMPLE: LEFT UPPER BACK
LOCATION SIMPLE: LEFT THIGH
LOCATION SIMPLE: LEFT BUTTOCK
LOCATION SIMPLE: LEFT UPPER ARM
LOCATION SIMPLE: UPPER BACK

## 2023-09-20 NOTE — PROCEDURE: DIAGNOSIS COMMENT
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
Render Risk Assessment In Note?: no
Detail Level: Simple

## 2023-12-19 ENCOUNTER — APPOINTMENT (OUTPATIENT)
Dept: OTOLARYNGOLOGY | Facility: CLINIC | Age: 88
End: 2023-12-19
Payer: MEDICARE

## 2023-12-19 VITALS
SYSTOLIC BLOOD PRESSURE: 115 MMHG | HEIGHT: 61 IN | HEART RATE: 76 BPM | BODY MASS INDEX: 26.24 KG/M2 | DIASTOLIC BLOOD PRESSURE: 71 MMHG | WEIGHT: 139 LBS

## 2023-12-19 DIAGNOSIS — R09.82 POSTNASAL DRIP: ICD-10-CM

## 2023-12-19 DIAGNOSIS — L29.9 PRURITUS, UNSPECIFIED: ICD-10-CM

## 2023-12-19 PROCEDURE — 99214 OFFICE O/P EST MOD 30 MIN: CPT

## 2023-12-19 RX ORDER — HYDROCORTISONE AND ACETIC ACID OTIC 20.75; 10.375 MG/ML; MG/ML
1-2 SOLUTION AURICULAR (OTIC)
Qty: 1 | Refills: 4 | Status: ACTIVE | COMMUNITY
Start: 2023-12-19 | End: 1900-01-01

## 2023-12-19 NOTE — PHYSICAL EXAM
[Hearing Loss Right Only] : normal [Hearing Loss Left Only] : normal [] : septum deviated bilaterally [de-identified] : nasal julissa collapse [de-identified] :  mildly inflammed turbinates, visible bilaterally [Midline] : trachea located in midline position [de-identified] : class 1 [Normal] : orientation to person, place, and time: normal [FreeTextEntry2] :  sinuses nontender to percussion  sensations intact

## 2023-12-19 NOTE — HISTORY OF PRESENT ILLNESS
[de-identified] :  Pt. with h/o asymmetric hearing loss(wears bilateral hearing aids), b/l tinnitus, PND, enlarged submandibular region, right lymph node presents today with a checkup on ears.. Pt states that nasal spray has been helping Pt states that she has only been using her left hearing aid. Pt states that nasal spray has been helping. Pt states that she has only been using her left hearing aid. Pt also states that her ears are itching

## 2023-12-19 NOTE — ASSESSMENT
[FreeTextEntry1] :   Reviewed and reconciled medications, allergies, PMHx, PSHx, SocHx, FMHx.   Pt. with h/o asymmetric hearing loss(wears bilateral hearing aids), b/l tinnitus, PND, enlarged submandibular region, right lymph node presents today with a checkup on ears.. Pt states that nasal spray has been helping. Pt states that she has only been using her left hearing aid. Pt also states that her ears are itching  Physical exam: -deviated septum -nasal valve collapse -mildly inflamed turbinates, visible bilaterally -class 1 tonsils  Plan: -discussed r/b/a of rhinaer. Patient elected to follow through with the rhinear procedure. -start using acetic acid with hydrocortisone drops for itchy ears -follow up after procedure

## 2023-12-19 NOTE — CONSULT LETTER
[Courtesy Letter:] : I had the pleasure of seeing your patient, [unfilled], in my office today. [Please see my note below.] : Please see my note below. [Consult Closing:] : Thank you very much for allowing me to participate in the care of this patient.  If you have any questions, please do not hesitate to contact me. [Sincerely,] : Sincerely, [FreeTextEntry3] :  Abner Sol MD FACS

## 2024-06-14 ENCOUNTER — APPOINTMENT (OUTPATIENT)
Dept: OTOLARYNGOLOGY | Facility: CLINIC | Age: 89
End: 2024-06-14
Payer: MEDICARE

## 2024-06-14 VITALS — SYSTOLIC BLOOD PRESSURE: 118 MMHG | HEART RATE: 69 BPM | DIASTOLIC BLOOD PRESSURE: 65 MMHG

## 2024-06-14 DIAGNOSIS — J34.2 DEVIATED NASAL SEPTUM: ICD-10-CM

## 2024-06-14 DIAGNOSIS — H90.3 SENSORINEURAL HEARING LOSS, BILATERAL: ICD-10-CM

## 2024-06-14 DIAGNOSIS — H60.8X3 OTHER OTITIS EXTERNA, BILATERAL: ICD-10-CM

## 2024-06-14 DIAGNOSIS — J31.0 CHRONIC RHINITIS: ICD-10-CM

## 2024-06-14 PROCEDURE — 92557 COMPREHENSIVE HEARING TEST: CPT

## 2024-06-14 PROCEDURE — 92567 TYMPANOMETRY: CPT

## 2024-06-14 PROCEDURE — 99213 OFFICE O/P EST LOW 20 MIN: CPT

## 2024-06-14 RX ORDER — IPRATROPIUM BROMIDE 42 UG/1
0.06 SPRAY NASAL
Qty: 3 | Refills: 3 | Status: ACTIVE | COMMUNITY
Start: 2023-03-13 | End: 1900-01-01

## 2024-06-14 NOTE — HISTORY OF PRESENT ILLNESS
[de-identified] : Pt. with h/o asymmetric hearing loss(wears bilateral hearing aids), PND, enlarged submandibular region, right lymph node presents today for a follow up. She states that she is doing well. She denies changes to her hearing. She denies tinnitus. She states that she still uses Atrovent. She states that she will continue Atrovent instead of having procedure.

## 2024-06-14 NOTE — ADDENDUM
[FreeTextEntry1] :  Documented by Ravin Rivero acting as scribe for Dr. Sol on 06/14/2024. All Medical record entries made by the Scribe were at my, Dr. Sol, direction and personally dictated by me on 06/14/2024 . I have reviewed the chart and agree that the record accurately reflects my personal performance of the history, physical exam, assessment and plan. I have also personally directed, reviewed, and agreed with the discharge instructions.

## 2024-06-14 NOTE — PHYSICAL EXAM
[Hearing Posada Test (Tuning Fork On Forehead)] : no lateralization of tone [] : septum deviated bilaterally [Midline] : trachea located in midline position [Normal] : no rashes [de-identified] : submandibular glands slihtly enlarged, but sogt and symmetric [Hearing Loss Right Only] : normal [Hearing Loss Left Only] : normal [FreeTextEntry8] : minimal cerumen removed via curettage [FreeTextEntry9] : minimal cerumen removed via curettage [de-identified] :  mildly inflamed turbinates [de-identified] : class 1 [de-identified] : type 1 oral cavity. amalgam stain in the left cheek [FreeTextEntry2] :  sinuses nontender to percussion.  sensations intact

## 2024-06-14 NOTE — CONSULT LETTER
[Dear  ___] : Dear  [unfilled], [Courtesy Letter:] : I had the pleasure of seeing your patient, [unfilled], in my office today. [Please see my note below.] : Please see my note below. [Consult Closing:] : Thank you very much for allowing me to participate in the care of this patient.  If you have any questions, please do not hesitate to contact me. [Sincerely,] : Sincerely, [FreeTextEntry3] :  Abner Sol MD FACS

## 2024-06-14 NOTE — ASSESSMENT
[FreeTextEntry1] : Reviewed and reconciled medications, allergies, PMHx, PSHx, SocHx, FMHx.  Pt. with h/o asymmetric hearing loss(wears bilateral hearing aids), PND, enlarged submandibular region, right lymph node presents today for a follow up. She states that she is doing well. She denies changes to her hearing. She denies tinnitus. She states that she still uses Atrovent. She states that she will continue Atrovent instead of having procedure.   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 Sol and reviewed with the patient. -Continue Atrovent - 1 or 2 sprays bilaterally up to QID, spray laterally -Use hearing aids more often -Upgrade hearing aids -FU in 1 year

## 2024-06-14 NOTE — DATA REVIEWED
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

## 2024-12-09 ENCOUNTER — APPOINTMENT (OUTPATIENT)
Dept: OTOLARYNGOLOGY | Facility: CLINIC | Age: 88
End: 2024-12-09
Payer: MEDICARE

## 2024-12-09 VITALS — DIASTOLIC BLOOD PRESSURE: 72 MMHG | HEART RATE: 67 BPM | SYSTOLIC BLOOD PRESSURE: 126 MMHG

## 2024-12-09 VITALS — HEIGHT: 61 IN | BODY MASS INDEX: 26.24 KG/M2 | WEIGHT: 139 LBS

## 2024-12-09 DIAGNOSIS — H60.8X3 OTHER OTITIS EXTERNA, BILATERAL: ICD-10-CM

## 2024-12-09 DIAGNOSIS — H93.13 TINNITUS, BILATERAL: ICD-10-CM

## 2024-12-09 DIAGNOSIS — J34.2 DEVIATED NASAL SEPTUM: ICD-10-CM

## 2024-12-09 DIAGNOSIS — J34.89 OTHER SPECIFIED DISORDERS OF NOSE AND NASAL SINUSES: ICD-10-CM

## 2024-12-09 DIAGNOSIS — J31.0 CHRONIC RHINITIS: ICD-10-CM

## 2024-12-09 DIAGNOSIS — H90.3 SENSORINEURAL HEARING LOSS, BILATERAL: ICD-10-CM

## 2024-12-09 PROCEDURE — 92557 COMPREHENSIVE HEARING TEST: CPT

## 2024-12-09 PROCEDURE — 92567 TYMPANOMETRY: CPT

## 2024-12-09 PROCEDURE — 99213 OFFICE O/P EST LOW 20 MIN: CPT

## 2025-02-13 NOTE — ED PROVIDER NOTE - BIRTH SEX
Health Maintenance       Depression Screening (Yearly)  Never done    Hepatitis B Vaccine (1 of 3 - 19+ 3-dose series)  Never done    Colorectal Cancer Screen (View Topic Details)  Ordered on 6/19/2024    Shingles Vaccine (1 of 2)  Never done    Pneumococcal Vaccine 50+ (1 of 1 - PCV)  Never done    Influenza Vaccine (1)  Never done    COVID-19 Vaccine (1 - 2024-25 season)  Never done           Following review of the above:  Patient is not proceeding with: COVID-19, Hep B, Influenza, Pneumococcal, and Shingles    Note: Refer to final orders and clinician documentation.        Female

## 2025-04-07 ENCOUNTER — APPOINTMENT (OUTPATIENT)
Dept: OTOLARYNGOLOGY | Facility: CLINIC | Age: 89
End: 2025-04-07
Payer: MEDICARE

## 2025-04-07 VITALS
BODY MASS INDEX: 26.06 KG/M2 | HEART RATE: 80 BPM | WEIGHT: 138 LBS | HEIGHT: 61 IN | DIASTOLIC BLOOD PRESSURE: 75 MMHG | SYSTOLIC BLOOD PRESSURE: 146 MMHG

## 2025-04-07 DIAGNOSIS — J31.0 CHRONIC RHINITIS: ICD-10-CM

## 2025-04-07 DIAGNOSIS — H90.3 SENSORINEURAL HEARING LOSS, BILATERAL: ICD-10-CM

## 2025-04-07 DIAGNOSIS — R09.82 POSTNASAL DRIP: ICD-10-CM

## 2025-04-07 DIAGNOSIS — J34.89 OTHER SPECIFIED DISORDERS OF NOSE AND NASAL SINUSES: ICD-10-CM

## 2025-04-07 DIAGNOSIS — K11.20 SIALOADENITIS, UNSPECIFIED: ICD-10-CM

## 2025-04-07 PROCEDURE — 99213 OFFICE O/P EST LOW 20 MIN: CPT

## 2025-04-07 RX ORDER — BIOTIN 10 MG
TABLET ORAL
Refills: 0 | Status: ACTIVE | COMMUNITY

## 2025-04-07 RX ORDER — ESCITALOPRAM OXALATE 5 MG/1
5 TABLET ORAL
Refills: 0 | Status: ACTIVE | COMMUNITY

## 2025-04-07 RX ORDER — LORAZEPAM 0.5 MG/1
0.5 TABLET ORAL
Refills: 0 | Status: ACTIVE | COMMUNITY

## 2025-04-07 RX ORDER — VIT C/E/CUPERIC/ZINC/LUTEIN 226-90-0.8
CAPSULE ORAL
Refills: 0 | Status: ACTIVE | COMMUNITY